# Patient Record
Sex: MALE | Race: WHITE | NOT HISPANIC OR LATINO | Employment: UNEMPLOYED | URBAN - METROPOLITAN AREA
[De-identification: names, ages, dates, MRNs, and addresses within clinical notes are randomized per-mention and may not be internally consistent; named-entity substitution may affect disease eponyms.]

---

## 2017-01-17 ENCOUNTER — HOSPITAL ENCOUNTER (EMERGENCY)
Facility: OTHER | Age: 42
Discharge: HOME | End: 2017-01-17
Attending: EMERGENCY MEDICINE
Payer: MEDICAID

## 2017-01-17 VITALS
HEART RATE: 95 BPM | WEIGHT: 183 LBS | SYSTOLIC BLOOD PRESSURE: 138 MMHG | OXYGEN SATURATION: 100 % | DIASTOLIC BLOOD PRESSURE: 90 MMHG | BODY MASS INDEX: 27.83 KG/M2 | TEMPERATURE: 100 F | RESPIRATION RATE: 18 BRPM

## 2017-01-17 DIAGNOSIS — L08.9 WOUND INFECTION: ICD-10-CM

## 2017-01-17 DIAGNOSIS — T14.8XXA WOUND INFECTION: ICD-10-CM
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
## 2017-01-17 DIAGNOSIS — M79.662 PAIN OF LEFT LOWER LEG: Primary | ICD-10-CM

## 2017-01-17 PROCEDURE — 25000003 PHARM REV CODE 250: Performed by: EMERGENCY MEDICINE

## 2017-01-17 PROCEDURE — 99283 EMERGENCY DEPT VISIT LOW MDM: CPT | Mod: 25

## 2017-01-17 PROCEDURE — 96372 THER/PROPH/DIAG INJ SC/IM: CPT

## 2017-01-17 PROCEDURE — 63600175 PHARM REV CODE 636 W HCPCS: Performed by: EMERGENCY MEDICINE

## 2017-01-17 RX ORDER — CLINDAMYCIN HYDROCHLORIDE 300 MG/1
300 CAPSULE ORAL 4 TIMES DAILY
Qty: 40 CAPSULE | Refills: 0 | Status: SHIPPED | OUTPATIENT
Start: 2017-01-17 | End: 2017-01-27

## 2017-01-17 RX ORDER — KETOROLAC TROMETHAMINE 30 MG/ML
60 INJECTION, SOLUTION INTRAMUSCULAR; INTRAVENOUS
Status: COMPLETED | OUTPATIENT
Start: 2017-01-17 | End: 2017-01-17

## 2017-01-17 RX ORDER — CLINDAMYCIN HYDROCHLORIDE 300 MG/1
300 CAPSULE ORAL 4 TIMES DAILY
Qty: 40 CAPSULE | Refills: 0 | Status: SHIPPED | OUTPATIENT
Start: 2017-01-17 | End: 2017-01-17

## 2017-01-17 RX ORDER — OXYCODONE AND ACETAMINOPHEN 5; 325 MG/1; MG/1
1 TABLET ORAL
Status: DISCONTINUED | OUTPATIENT
Start: 2017-01-17 | End: 2017-01-17

## 2017-01-17 RX ORDER — CLINDAMYCIN PHOSPHATE 150 MG/ML
600 INJECTION, SOLUTION INTRAVENOUS
Status: COMPLETED | OUTPATIENT
Start: 2017-01-17 | End: 2017-01-17

## 2017-01-17 RX ADMIN — CLINDAMYCIN PHOSPHATE 600 MG: 150 INJECTION, SOLUTION INTRAVENOUS at 06:01

## 2017-01-17 RX ADMIN — KETOROLAC TROMETHAMINE 60 MG: 60 INJECTION, SOLUTION INTRAMUSCULAR at 06:01

## 2017-01-17 NOTE — ED NOTES
Pt reports to ED with c/o L leg pain. Reports that he had a fall from his wheelchair on yesterday and fell onto leg. Pt describes the pain as a burning pain. Reports that he had surgery x4 months ago to L ankle and noticed pus leaking from the site.

## 2017-01-17 NOTE — ED AVS SNAPSHOT
[facility] EMERGENCY DEPARTMENT  [address] [place] [address]               Demario Colbert   2017  4:55 PM   ED    Description:  Male : 1975   Department:  [facility] Emergency Department           Your Care was Coordinated By:     Provider Role From To    Rayne Flores MD Attending Provider 17 1700 --      Reason for Visit     Leg Pain           Diagnoses this Visit        Comments    Pain of left lower leg    -  Primary     Fall         Wound infection           ED Disposition     ED Disposition Condition Comment    Discharge             To Do List           Follow-up Information     Follow up with Your Orthopedic Surgeon In 1 week.    Why:  For recheck if symptoms fail to improve or resolve       These Medications        Disp Refills Start End    clindamycin (CLEOCIN) 300 MG capsule 40 capsule 0 2017    Take 1 capsule (300 mg total) by mouth 4 (four) times daily. - Oral    Pharmacy: [facility] [place]  [place], [place] [address] #: [phone]         [facility] On Call     [facility] On Call Nurse Care Line -  Assistance  Registered nurses in the [facility] On Call Center provide clinical advisement, health education, appointment booking, and other advisory services.  Call for this free service at [phone].             Medications           Message regarding Medications     Verify the changes and/or additions to your medication regime listed below are the same as discussed with your clinician today.  If any of these changes or additions are incorrect, please notify your healthcare provider.        START taking these NEW medications        Refills    clindamycin (CLEOCIN) 300 MG capsule 0    Sig: Take 1 capsule (300 mg total) by mouth 4 (four) times daily.    Class: Print    Route: Oral      These medications were administered today        Dose Freq    clindamycin injection 600 mg 600 mg ED 1 Time    Sig: Inject 4 mLs (600 mg total)  into the muscle ED 1 Time.    Class: Normal    Route: Intramuscular    ketorolac injection 60 mg 60 mg ED 1 Time    Sig: Inject 2 mLs (60 mg total) into the muscle ED 1 Time.    Class: Normal    Route: Intramuscular           Verify that the below list of medications is an accurate representation of the medications you are currently taking.  If none reported, the list may be blank. If incorrect, please contact your healthcare provider. Carry this list with you in case of emergency.           Current Medications     clindamycin (CLEOCIN) 300 MG capsule Take 1 capsule (300 mg total) by mouth 4 (four) times daily.    ibuprofen (ADVIL,MOTRIN) 800 MG tablet Take 1 tablet (800 mg total) by mouth every 6 (six) hours as needed for Pain.    OXCARBAZEPINE (TRILEPTAL ORAL) Take by mouth.           Clinical Reference Information           Your Vitals Were     BP Pulse Temp Resp Weight SpO2    138/90 95 99.8 °F (37.7 °C) (Oral) 18 83 kg (183 lb) 100%    BMI                27.83 kg/m2          Allergies as of 1/17/2017     No Known Allergies      Immunizations Administered on Date of Encounter - 1/17/2017     None      ED Micro, Lab, POCT     None      ED Imaging Orders     Start Ordered       Status Ordering Provider    01/17/17 1737 01/17/17 1736  X-Ray Tibia Fibula 2 View Left  1 time imaging      Final result         Discharge Instructions       If the skin about your ankle wound develops redness, increasing swelling or pain, or drains further pus after 2 days of antibiotics, contact your orthopedic surgeon for possible earlier appointment because you may need admission for IV antibiotics.    Discharge References/Attachments     WOUND CHECK (INFECTION) (ENGLISH)      Smoking Cessation     If you would like to quit smoking:   You may be eligible for free services if you are a [place] resident and started smoking cigarettes before September 1, 1988.  Call the [employer]ess[employer] ([employer]) toll free at (432) 053-8212 or (426)  145-5289.   Call 1-800-QUIT-NOW if you do not meet the above criteria.             [facility] Emergency Department complies with applicable Federal civil rights laws and does not discriminate on the basis of race, color, national origin, age, disability, or sex.        Language Assistance Services     ATTENTION: Language assistance services are available, free of charge. Please call [phone].      ATENCIÓN: Si habla español, tiene a ba disposición servicios gratuitos de asistencia lingüística. Llame al [phone].     CHÚ Ý: N?u b?n nói Ti?ng Vi?t, có các d?ch v? h? tr? ngôn ng? mi?n phí dành cho b?n. G?i s? [phone].

## 2017-01-18 NOTE — ED PROVIDER NOTES
Encounter Date: 1/17/2017       History     Chief Complaint   Patient presents with    Leg Pain     states he had surgery 4 months ago, on his left leg, states he fell out his wheel chair on yesterday, C/O left leg pain      Review of patient's allergies indicates:  No Known Allergies  HPI Comments: 41-year-old male who presents with left lower leg pain.  Patient reports a wheel came off the chair he was sitting in any fell over.  He reports pain in his left shin since the incident.  Patient has broken his left ankle several times and has required several surgeries including an intramedullary gwendolyn and pins.  Patient also reports that he is able to express a small amount of pus from his old surgical incision on the lateral aspect of his ankle since yesterday.  He reports he had this same problem about a month ago, was treated by his orthopedist with clindamycin 300 mg 3 times a day for 10 days and it cleared up.  He reports multiple episodes of infection and other complications from his leg injuries, with consideration for possible amputation.  He denies fever or chills.  He has follow-up next week with his orthopedic surgeon.      Patient is a 41 y.o. male presenting with the following complaint: leg pain. The history is provided by the patient.   Leg Pain    The incident occurred at home. The injury mechanism was a fall. The incident occurred several hours ago. The pain is present in the left leg. The quality of the pain is described as aching. The pain is at a severity of 8/10. The pain has been constant since onset. Associated symptoms include inability to bear weight. Pertinent negatives include no numbness and no tingling. He reports no foreign bodies present.     No past medical history on file.  No past medical history pertinent negatives.  No past surgical history on file.  No family history on file.  Social History   Substance Use Topics    Smoking status: Current Every Day Smoker     Packs/day: 1.00      Types: Cigarettes    Smokeless tobacco: Not on file    Alcohol use Yes     Review of Systems   Constitutional: Negative for chills and fever.   Musculoskeletal: Positive for gait problem. Negative for joint swelling.   Skin: Positive for wound. Negative for color change.   Neurological: Negative for tingling, weakness and numbness.       Physical Exam   Initial Vitals   BP Pulse Resp Temp SpO2   01/17/17 1654 01/17/17 1654 01/17/17 1654 01/17/17 1654 01/17/17 1654   138/90 95 18 99.8 °F (37.7 °C) 100 %     Physical Exam    Nursing note and vitals reviewed.  Constitutional: Vital signs are normal. He appears well-developed and well-nourished. He is cooperative.   HENT:   Head: Normocephalic and atraumatic.   Cardiovascular: Normal rate and regular rhythm.   Pulses:       Dorsalis pedis pulses are 2+ on the right side, and 2+ on the left side.   Brisk capillary refill to bilateral feet   Musculoskeletal:        Right lower leg: Normal.        Left lower leg: He exhibits tenderness. He exhibits no swelling and no deformity.   Multiple healed surgical scars with skin grafts to the left lower extremity.  This tenderness along the anterior aspect of the tibia to palpation.  No bruising or soft tissue swelling or erythema.   Neurological: He is alert and oriented to person, place, and time. No sensory deficit.   There is no sensory deficit to bilateral lower extremities   Skin: Skin is warm and dry.   There is a linear, healed surgical scar over the lateral malleolus of the left ankle, from which is expressed a minimal amount of purulence.  There is no tenderness, erythema, or soft tissue swelling to the area.         ED Course   Procedures  Labs Reviewed - No data to display         X-RAY IMPRESSION:  Nonspecific lucency within the tibial cortex adjacent to the IM nail. This may be related to track from prior removed hardware.   Also there is lucency of the distal tibial metaphysis with questionable periosteal reaction.  Infection is not excluded.                        ED Course     Clinical Impression:   The primary encounter diagnosis was Pain of left lower leg. Diagnoses of Fall and Wound infection were also pertinent to this visit.    Disposition:   Disposition: Discharged  Condition: Stable       Rayne Flores MD  01/17/17 9050

## 2017-01-18 NOTE — DISCHARGE INSTRUCTIONS
If the skin about your ankle wound develops redness, increasing swelling or pain, or drains further pus after 2 days of antibiotics, contact your orthopedic surgeon for possible earlier appointment because you may need admission for IV antibiotics.

## 2021-04-16 ENCOUNTER — PATIENT MESSAGE (OUTPATIENT)
Dept: RESEARCH | Facility: HOSPITAL | Age: 46
End: 2021-04-16

## 2021-07-01 ENCOUNTER — PATIENT MESSAGE (OUTPATIENT)
Dept: ADMINISTRATIVE | Facility: OTHER | Age: 46
End: 2021-07-01